# Patient Record
Sex: FEMALE | NOT HISPANIC OR LATINO | URBAN - NONMETROPOLITAN AREA
[De-identification: names, ages, dates, MRNs, and addresses within clinical notes are randomized per-mention and may not be internally consistent; named-entity substitution may affect disease eponyms.]

---

## 2020-01-04 ENCOUNTER — NURSE TRIAGE (OUTPATIENT)
Dept: CALL CENTER | Facility: HOSPITAL | Age: 25
End: 2020-01-04

## 2020-01-04 NOTE — TELEPHONE ENCOUNTER
"States last night she woke up and her left eye is blurry. States when she closes her eye, its really blurry. States when she puts her head down it goes numb down my right arm and down my spine but when I move my neck it goes away.     Reason for Disposition  • [1] Blurred vision or visual changes AND [2] present now AND [3] sudden onset or new (e.g., minutes, hours, days)  (Exception: seeing floaters / black specks OR previously diagnosed migraine headaches with same symptoms)    Additional Information  • Negative: Weakness of the face, arm or leg on one side of the body  • Negative: Followed getting substance in the eye  • Negative: Foreign body or object is or was lodged in the eye  • Negative: Followed an eye injury  • Negative: Followed sun lamp or sun exposure (UV keratitis)  • Negative: Yellow or green discharge (pus) in the eye  • Negative: Pregnant  • Negative: Postpartum (from 0 to 6 weeks after delivery)  • Negative: Complete loss of vision in 1 or both eyes  • Negative: Severe eye pain  • Negative: SEVERE headache  • Negative: Double vision    Answer Assessment - Initial Assessment Questions  1. DESCRIPTION: \"What is the vision loss like? Describe it for me.\" (e.g., complete vision loss, blurred vision, double vision, floaters, etc.)      Blurred vision  2. LOCATION: \"One or both eyes?\" If one, ask: \"Which eye?\"      Left eye  3. SEVERITY: \"Can you see anything?\" If so, ask: \"What can you see?\" (e.g., fine print)      Just blurry  4. ONSET: \"When did this begin?\" \"Did it start suddenly or has this been gradual?\"      Woke up last night with it  5. PATTERN: \"Does this come and go, or has it been constant since it started?\"      constant  6. PAIN: \"Is there any pain in your eye(s)?\"  (Scale 1-10; or mild, moderate, severe)      no  7. CONTACTS-GLASSES: \"Do you wear contacts or glasses?\"      no  8. CAUSE: \"What do you think is causing this visual problem?\"      [id number]  9. OTHER SYMPTOMS: \"Do you have any " "other symptoms?\" (e.g., confusion, headache, arm or leg weakness, speech problems)      See note  10. PREGNANCY: \"Is there any chance you are pregnant?\" \"When was your last menstrual period?\"        No; has a 4 month old    Protocols used: VISION LOSS OR CHANGE-ADULT-AH      "